# Patient Record
Sex: MALE | Race: ASIAN | NOT HISPANIC OR LATINO | Employment: PART TIME | URBAN - METROPOLITAN AREA
[De-identification: names, ages, dates, MRNs, and addresses within clinical notes are randomized per-mention and may not be internally consistent; named-entity substitution may affect disease eponyms.]

---

## 2023-12-21 ENCOUNTER — TRANSCRIBE ORDERS (OUTPATIENT)
Dept: PHYSICAL THERAPY | Facility: CLINIC | Age: 28
End: 2023-12-21
Payer: OTHER MISCELLANEOUS

## 2023-12-21 DIAGNOSIS — S46.911D STRAIN OF RIGHT SHOULDER, SUBSEQUENT ENCOUNTER: ICD-10-CM

## 2023-12-21 DIAGNOSIS — S46.912D STRAIN OF LEFT SHOULDER, SUBSEQUENT ENCOUNTER: Primary | ICD-10-CM

## 2023-12-28 ENCOUNTER — TREATMENT (OUTPATIENT)
Dept: PHYSICAL THERAPY | Facility: CLINIC | Age: 28
End: 2023-12-28
Payer: OTHER MISCELLANEOUS

## 2023-12-28 DIAGNOSIS — M25.611 DECREASED RANGE OF MOTION OF RIGHT SHOULDER: ICD-10-CM

## 2023-12-28 DIAGNOSIS — M25.511 ACUTE PAIN OF BOTH SHOULDERS: Primary | ICD-10-CM

## 2023-12-28 DIAGNOSIS — M25.512 ACUTE PAIN OF BOTH SHOULDERS: Primary | ICD-10-CM

## 2023-12-28 DIAGNOSIS — M25.612 DECREASED ROM OF LEFT SHOULDER: ICD-10-CM

## 2023-12-28 PROCEDURE — 97110 THERAPEUTIC EXERCISES: CPT | Performed by: PHYSICAL THERAPIST

## 2023-12-28 PROCEDURE — 97161 PT EVAL LOW COMPLEX 20 MIN: CPT | Performed by: PHYSICAL THERAPIST

## 2023-12-28 PROCEDURE — 97530 THERAPEUTIC ACTIVITIES: CPT | Performed by: PHYSICAL THERAPIST

## 2023-12-28 NOTE — PROGRESS NOTES
Physical Therapy Initial Evaluation and Plan of Care             [address] [facility] [address]    [place] [address]    Patient: Stephanie Trujillo   : 1995  Diagnosis/ICD-10 Code:  Acute pain of both shoulders [M25.511, M25.512]  Referring practitioner: Neto Patterson MD  Date of Initial Visit: 2023  Today's Date: 2023  Patient seen for 1 session         Visit Diagnoses:    ICD-10-CM ICD-9-CM   1. Acute pain of both shoulders  M25.511 719.41    M25.512    2. Decreased range of motion of right shoulder  M25.611 719.51   3. Decreased ROM of left shoulder  M25.612 719.51         Subjective Questionnaire: QuickDASH: 56.82% impairment      Subjective Evaluation    History of Present Illness  Date of onset: 2023  Mechanism of injury: Injured B shoulders in separate incidents at work. In 2023 developed L shoulder pn suddenly after donning his safety harness at work, which weighs approx 20 lbs. Was given sling, medication and company gave him 3 days off work. Pn has persisted and has been limited w/ use of his L arm at work. L shoulder pn localized to top of shoulder.     R shoulder injury occurred on 23 when pt fell from a 6 foot ladder and landed directly on his R shoulder w/ his arm against his side. Pn located on the superior/lateral aspect of the R shoulder and can radiate into the lateral upper arm. Will wake him if he lays on his R side.     Neither shoulder painful at rest, but will increase w/ movement of the arm away from the body, lifting heavy items close to him or any object w/ arms outstretched. (-) popping, clicking, N/T. Describes as sharp, needlike pn bilaterally. No significant loss of motion in L shoulder, but limited on R. Had (-) XR of R shoulder, MRI showing supraspinatus tendinosus. No imaging of L shoulder.    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 Involves standing on ladder/lift while performing  sustained overhead work. Lifts large, heavy rolls of plastic from ground to shoulder height repetitively.    F/u w/ ortho 24    Subjective comment: B shoulder pn  Patient Occupation: [employer] Quality of life: fair    Pain  Current pain ratin  At best pain ratin  At worst pain ratin    Hand dominance: right    Diagnostic Tests  Abnormal x-ray: R shoulder XR (-).  MRI studies: abnormal (mild chronic tendinosus of the supraspinatus)    Treatments  No previous or current treatments  Patient Goals  Patient goals for therapy: decreased pain, increased motion, increased strength, independence with ADLs/IADLs, return to sport/leisure activities and return to work             Treatment  See Exercise, Manual, and Modality Logs for complete treatment.     Access Code: [email or web address]  URL: [email or web address]  Date: 2023  Prepared by: Keiry Jacobson    Exercises  - Standing Shoulder and Trunk Flexion at Table  - 2-3 x daily - 5-10 reps  - Seated Shoulder External Rotation AAROM with Cane and Hand in Neutral  - 2-3 x daily - 5-10 reps  - Supine Shoulder Press AAROM in Abduction with Dowel  - 2-3 x daily - 15-20 reps  - Seated Scapular Retraction  - 2-3 x daily - 15-30 reps    Objective          Observations   Left Shoulder   Positive for deformity (questionable step off deformity L AC jt).     Right Shoulder  Negative for deformity.       Tenderness     Left Shoulder   Tenderness in the AC joint.     Right Shoulder  Tenderness in the biceps tendon (proximal), subacromial bursa and supraspinatus tendon.     Active Range of Motion   Left Shoulder   Flexion: 160 degrees with pain  Abduction: 165 degrees with pain  External rotation 0°: 60 degrees   External rotation BTH: T2   Internal rotation BTB: T8     Right Shoulder   Flexion: 140 degrees with pain  Abduction: 135 degrees with pain  External rotation 0°: 60 (pn top of shoulder) degrees with pain  External rotation BTH: C7   Internal rotation  BTB: L1 with pain    Passive Range of Motion   Left Shoulder   Flexion: 170 degrees   Abduction: 170 degrees   External rotation 0°: 80 degrees   External rotation 90°: 90 degrees     Right Shoulder   Flexion: 90 degrees   Abduction: 90 degrees   External rotation 0°: 60 degrees   External rotation 90°: 90 degrees     Additional Passive Range of Motion Details  Pn/guarding w/ attempted passive elevation R shoulder    Strength/Myotome Testing     Left Shoulder     Planes of Motion   Flexion: 5   Abduction: 4+   External rotation at 0°: 4+   Internal rotation at 0°: 5     Isolated Muscles   Biceps: 5   Triceps: 5     Right Shoulder     Planes of Motion   Flexion: 4-   Abduction: 3-   External rotation at 0°: 4   Internal rotation at 0°: 4     Isolated Muscles   Biceps: 5   Triceps: 5     Additional Strength Details  Pn w/ resisted IR/ER, elevation R shoulder  Pn w/ resisted ER L shoulder    Tests   Cervical     Left   Positive active compression (Chloride) (AC jt pn).     Right   Negative active compression (Chloride) and ULTT4.     Left Shoulder   Positive crossover, Hawkin's and Neer's.   Negative belly press, drop arm, external rotation lag sign, full can, internal rotation lag sign, painful arc, Alex's sign and Speed's.     Right Shoulder   Positive full can, Hawkin's and Neer's.   Negative belly press, drop arm, external rotation lag sign, internal rotation lag sign, painful arc, Alex's sign and Speed's.             Assessment & Plan       Assessment  Impairments: abnormal or restricted ROM, activity intolerance, impaired physical strength, lacks appropriate home exercise program and pain with function   Functional limitations: carrying objects, lifting, sleeping, pulling, pushing, uncomfortable because of pain, reaching behind back, reaching overhead and unable to perform repetitive tasks   Assessment details: Pt is a 28 YOM who presents to PT w/ complaint of acute onset B shoulder pn after separate work  injuries, the first involving the L shoulder and occurring in June/July 2023 after donning his safety harness at work, the second involving the R shoulder and occurring on 11/18/23 after falling from a ladder and landing directly on his R shoulder. Findings consistent w/ R RC strain, L AC joint sprain. Primary deficits include painful and limited shoulder mobility R>L, RC weakness R>L. Apparent step off deformity noted L AC jt where pt TTP. Also TTP R supraspinatus tendon, subacromial region. (-) drop arm, lag signs, labral tests. (+) impingement signs, pn w/ resisted elevation and rotation of the R shoulder; (+) crossover test, Manley w/ localized AC jt pn L shoulder. (-) mechanical symptoms, instability. Pn/deficits limit pt's tolerance to work/daily activities and disrupt sleep. Pt would benefit from skilled PT services to address deficits and allow return to full work and daily activities w/o pn or limitation.  Barriers to therapy: n/a  Prognosis: good    Goals  Plan Goals: STG 4wks  1) Pt to be compliant w/ initial HEP for ROM, strength, and symptom mgmt.  2) Pt to report pn w/ household activities to be no greater than 4/10.  3) Pt to improve B shoulder AROM to 160 deg elevation, 80 deg ER or better.  4) Pt to improve behind back IR ROM of the R shoulder to lower tspine or higher.  5) Pt to improve QD score to 40% impairment or better to reflect improved pn and function.    LTG 8wks  1) Pt to be independent w/ long term HEP and self mgmt.  2) Pt to tolerate 40 mins or more of work simulated activity w/ min to no pn or dysfunction.  3) Pt to improve shoulder strength to 4+/5 or better in all planes, bilaterally.  4) Pt to improve QD score to 20% impairment or better to reflect improved pn and function.       Plan  Therapy options: will be seen for skilled therapy services  Planned modality interventions: TENS, cryotherapy, thermotherapy (hydrocollator packs), ultrasound and dry needling  Planned therapy  interventions: flexibility, functional ROM exercises, home exercise program, joint mobilization, manual therapy, neuromuscular re-education, postural training, soft tissue mobilization, strengthening, stretching and therapeutic activities  Frequency: 2x week  Duration in weeks: 12  Treatment plan discussed with: patient  Plan details: PT POC to emphasize restoration of pn free shoulder mobility, scapular and shoulder strengthening, dynamic stabilization, and postural awareness utillizing TE/TA/NMR/MT, modalities as indicated for pn control        History # of Personal Factors and/or Comorbidities: LOW (0)  Examination of Body System(s): # of elements: LOW (1-2)  Clinical Presentation: EVOLVING  Clinical Decision Making: LOW       Timed:         Manual Therapy:    0     mins  01191;     Therapeutic Exercise:    15     mins  05246;     Neuromuscular Ericka:    0    mins  83314;    Therapeutic Activity:     10     mins  47961;     Gait Trainin     mins  54639;     Ultrasound:     0     mins  87741;    Ionto                               0    mins   67296  Self Care                       0     mins   13877  Canalith Repos    0     mins 64929      Un-Timed:  Electrical Stimulation:    0     mins  61443 ( );  Dry Needling     0     mins self-pay  Traction     0     mins 57430  Low Eval     25     Mins  90558  Mod Eval     0     Mins  75413  High Eval                       0     Mins  49403        Timed Treatment:   25   mins   Total Treatment:     50   mins          PT: Keiry Jacobson PT     [place] License: #[id number]    Electronically signed by Keiry Jacobson PT, 23, 1:03 PM EST    Certification Period: 2023 thru 3/26/2024  I certify that the therapy services are furnished while this patient is under my care.  The services outlined above are required by this patient, and will be reviewed every 90 days.         Physician Signature:__________________________________________________    PHYSICIAN:  Neto Patterson MD      DATE:     Please sign and return via fax to [phone]. Thank you, [facility].

## 2024-01-02 ENCOUNTER — TREATMENT (OUTPATIENT)
Dept: PHYSICAL THERAPY | Facility: CLINIC | Age: 29
End: 2024-01-02
Payer: OTHER MISCELLANEOUS

## 2024-01-02 DIAGNOSIS — M25.511 ACUTE PAIN OF BOTH SHOULDERS: Primary | ICD-10-CM

## 2024-01-02 DIAGNOSIS — M25.512 ACUTE PAIN OF BOTH SHOULDERS: Primary | ICD-10-CM

## 2024-01-02 DIAGNOSIS — M25.612 DECREASED ROM OF LEFT SHOULDER: ICD-10-CM

## 2024-01-02 DIAGNOSIS — M25.611 DECREASED RANGE OF MOTION OF RIGHT SHOULDER: ICD-10-CM

## 2024-01-02 PROCEDURE — 97112 NEUROMUSCULAR REEDUCATION: CPT | Performed by: PHYSICAL THERAPIST

## 2024-01-02 PROCEDURE — 97110 THERAPEUTIC EXERCISES: CPT | Performed by: PHYSICAL THERAPIST

## 2024-01-02 PROCEDURE — 97530 THERAPEUTIC ACTIVITIES: CPT | Performed by: PHYSICAL THERAPIST

## 2024-01-02 NOTE — PROGRESS NOTES
Physical Therapy Daily Treatment Note  [address] [facility]  [facility] [address]   [place] [address]      Patient: Stephanie Trujillo   : 1995  Referring practitioner: Neto Patterson MD  Date of Initial Visit: Type: THERAPY  Noted: 2023  Today's Date: 2024  Patient seen for 2 sessions       Visit Diagnoses:    ICD-10-CM ICD-9-CM   1. Acute pain of both shoulders  M25.511 719.41    M25.512    2. Decreased range of motion of right shoulder  M25.611 719.51   3. Decreased ROM of left shoulder  M25.612 719.51       Subjective   Stephanie Trujillo reports: Pn seems to be better. Not as painful to move his arms. Still painful to lay on R shoulder directly. Can sleep comfortably by changing position. No pn at rest. Went bowling over the holiday. Used his L hand primarily and didn't increase his pn. Attempted once with his R hand and didn't hurt, but didn't want to risk injuring further.    Pre tx pn score: 0  Post tx pn score: 0    Treatment  See Exercise, Manual, and Modality Logs for complete treatment.     Objective        Active Range of Motion   Left Shoulder   Flexion: 170 degrees without pn  Abduction: 165 degrees without pn  External rotation 0°: 60 degrees   External rotation BTH: T2   Internal rotation BTB: T8      Right Shoulder   Flexion: 140 degrees with pain  Abduction: 135 degrees with pain  External rotation 0°: 60 (pn top of shoulder) degrees with pain  External rotation BTH: T2  Internal rotation BTB: T9 with pain      Assessment & Plan       Assessment  Assessment details: Compliant w/ HEP and tolerating exercises well. Demo improved and less painful L shoulder mobility. R shoulder rotation ROM improved but elevation remains limited and painful. Continued w/ AAROM/stretching and scapular mobility exercises. Pt denied inc pn afterward. To add supine cane flxn and bent over row to HEP.    Plan  Plan details: Cont w/ scapular stabilization, shoulder ROM          Timed:         Manual Therapy:    0      mins  87465;     Therapeutic Exercise:    20     mins  15695;     Neuromuscular Ericka:    10    mins  87171;    Therapeutic Activity:     10     mins  25026;     Gait Trainin     mins  28241;     Ultrasound:     0     mins  49188;    Ionto                               0    mins   91590  Self Care                       0     mins   71937  Canalith Repos    0     mins 91949      Un-Timed:  Electrical Stimulation:    0     mins  37707 ( );  Dry Needling     0     mins self-pay  Traction     0     mins 77194      Timed Treatment:   40   mins   Total Treatment:     40   mins    Keiry Jacobson, PT  [place] License: #[id number]

## 2024-01-04 ENCOUNTER — TREATMENT (OUTPATIENT)
Dept: PHYSICAL THERAPY | Facility: CLINIC | Age: 29
End: 2024-01-04
Payer: OTHER MISCELLANEOUS

## 2024-01-04 DIAGNOSIS — M25.611 DECREASED RANGE OF MOTION OF RIGHT SHOULDER: ICD-10-CM

## 2024-01-04 DIAGNOSIS — M25.512 ACUTE PAIN OF BOTH SHOULDERS: Primary | ICD-10-CM

## 2024-01-04 DIAGNOSIS — M25.612 DECREASED ROM OF LEFT SHOULDER: ICD-10-CM

## 2024-01-04 DIAGNOSIS — M25.511 ACUTE PAIN OF BOTH SHOULDERS: Primary | ICD-10-CM

## 2024-01-04 PROCEDURE — 97110 THERAPEUTIC EXERCISES: CPT | Performed by: PHYSICAL THERAPIST

## 2024-01-04 PROCEDURE — 97140 MANUAL THERAPY 1/> REGIONS: CPT | Performed by: PHYSICAL THERAPIST

## 2024-01-04 PROCEDURE — 97112 NEUROMUSCULAR REEDUCATION: CPT | Performed by: PHYSICAL THERAPIST

## 2024-01-04 NOTE — PROGRESS NOTES
Physical Therapy Daily Treatment Note  [address]   [place] [address]      Patient: Stephanie Trujillo   : 1995  Referring practitioner: Neto Patterson MD  Date of Initial Visit: Type: THERAPY  Noted: 2023  Today's Date: 2024  Patient seen for 3 sessions       Visit Diagnoses:    ICD-10-CM ICD-9-CM   1. Acute pain of both shoulders  M25.511 719.41    M25.512    2. Decreased range of motion of right shoulder  M25.611 719.51   3. Decreased ROM of left shoulder  M25.612 719.51       Subjective   Stephanie Trujillo reports: Inc pn L shoulder after sawing material at work today. [place] ok after his last visit, no soreness. Till trying to avoid use of his R shoulder so R side not too painful at the moment. Following up w/ [facility] today.    Pre tx pn score: 3  Post tx pn score: 3    Treatment  See Exercise, Manual, and Modality Logs for complete treatment.     Objective         Assessment & Plan       Assessment  Assessment details: Inc L shoulder pn after work today. Regressed exercises slightly to avoid further exacerbation, focused on gentle AAROM and scap control activities. Incorporated joint mobilizations for pn modulation. Tight and slightly painful w/ posterior glide bilaterally. No inc in pn reported at end of visit.    Plan  Plan details: (+) post capsule stretch, may try TB rows          Timed:         Manual Therapy:    15     mins  19627;     Therapeutic Exercise:    25     mins  35071;     Neuromuscular Ericka:    8    mins  40183;    Therapeutic Activity:     0     mins  17341;     Gait Trainin     mins  64163;     Ultrasound:     0     mins  73657;    Ionto                               0    mins   99774  Self Care                       0     mins   67428  Canalith Repos    0     mins 26529      Un-Timed:  Electrical Stimulation:    0     mins  04847 (MC );  Dry Needling     0     mins self-pay  Traction     0     mins 21158      Timed Treatment:   48    mins   Total Treatment:     48   mins    Keiry Jacobson, PT  [place] License: #[id number]

## 2024-01-09 ENCOUNTER — TREATMENT (OUTPATIENT)
Dept: PHYSICAL THERAPY | Facility: CLINIC | Age: 29
End: 2024-01-09
Payer: OTHER MISCELLANEOUS

## 2024-01-09 DIAGNOSIS — M25.512 ACUTE PAIN OF BOTH SHOULDERS: Primary | ICD-10-CM

## 2024-01-09 DIAGNOSIS — M25.511 ACUTE PAIN OF BOTH SHOULDERS: Primary | ICD-10-CM

## 2024-01-09 DIAGNOSIS — M25.612 DECREASED ROM OF LEFT SHOULDER: ICD-10-CM

## 2024-01-09 DIAGNOSIS — M25.611 DECREASED RANGE OF MOTION OF RIGHT SHOULDER: ICD-10-CM

## 2024-01-09 NOTE — PROGRESS NOTES
Physical Therapy Daily Treatment Note  [address]   [place] [address]      Patient: Stephanie Trujillo   : 1995  Referring practitioner: Neto Patterson MD  Date of Initial Visit: Type: THERAPY  Noted: 2023  Today's Date: 2024  Patient seen for 4 sessions       Visit Diagnoses:    ICD-10-CM ICD-9-CM   1. Acute pain of both shoulders  M25.511 719.41    M25.512    2. Decreased range of motion of right shoulder  M25.611 719.51   3. Decreased ROM of left shoulder  M25.612 719.51       Subjective   Stephanie Trujillo reports: Exacerbated R shoulder pn at work today using a vacuum to clear water from the trunk of his vehicle. Reports inc pn lateral R shoulder, increases significantly w/ any movement of the R arm.  Saw Dr. Patterson after last visit, per pt report recommended he continue on current work restrictions. Will f/u again 24.    Pre tx pn score: 9-R shoulder  Post tx pn score: 9    Treatment  See Exercise, Manual, and Modality Logs for complete treatment.     Objective         Assessment & Plan       Assessment  Assessment details: R shoulder pn exacerbated after using vacuum at work earlier to today's appt. Attempted low level joint distraction, MT techniques to reduce pn and mm spasm but symptoms highly irritable and associated w/ strong muscle guarding. Pt reported inc pn w/ attempted scap mobility exercises. Did report slight relief w/ MHP and estim, but minimal and short lasting. Encouraged adherence to work restrictions, using ice and/or heat at home and on work breaks, considering use of sling temporarily to limit motion and reduce pn. Pt verbalized understanding.          Timed:         Manual Therapy:    12     mins  12321;     Therapeutic Exercise:    0     mins  30673;     Neuromuscular Ericka:    0    mins  79887;    Therapeutic Activity:     8     mins  94699;     Gait Trainin     mins  40030;     Ultrasound:     0     mins  10823;    Ionto                                0    mins   59528  Self Care                       0     mins   39208  Canalith Repos    0     mins 41065      Un-Timed:  Electrical Stimulation:    15     mins  40618 ( );  Dry Needling     0     mins self-pay  Traction     0     mins 24507      Timed Treatment:   35   mins   Total Treatment:     35   mins    Keiry Jacobson, PT  [place] License: #[id number]

## 2024-01-11 ENCOUNTER — TREATMENT (OUTPATIENT)
Dept: PHYSICAL THERAPY | Facility: CLINIC | Age: 29
End: 2024-01-11
Payer: OTHER MISCELLANEOUS

## 2024-01-11 DIAGNOSIS — M25.612 DECREASED ROM OF LEFT SHOULDER: ICD-10-CM

## 2024-01-11 DIAGNOSIS — M25.511 ACUTE PAIN OF BOTH SHOULDERS: Primary | ICD-10-CM

## 2024-01-11 DIAGNOSIS — M25.512 ACUTE PAIN OF BOTH SHOULDERS: Primary | ICD-10-CM

## 2024-01-11 DIAGNOSIS — M25.611 DECREASED RANGE OF MOTION OF RIGHT SHOULDER: ICD-10-CM

## 2024-01-11 PROCEDURE — 97110 THERAPEUTIC EXERCISES: CPT | Performed by: PHYSICAL THERAPIST

## 2024-01-11 PROCEDURE — 97140 MANUAL THERAPY 1/> REGIONS: CPT | Performed by: PHYSICAL THERAPIST

## 2024-01-11 PROCEDURE — 97112 NEUROMUSCULAR REEDUCATION: CPT | Performed by: PHYSICAL THERAPIST

## 2024-01-11 NOTE — PROGRESS NOTES
Physical Therapy Daily Treatment Note  [address] [facility] [address]   [place] [address]      Patient: Stephanie Trujillo   : 1995  Referring practitioner: Neto Patterson MD  Date of Initial Visit: Type: THERAPY  Noted: 2023  Today's Date: 2024  Patient seen for 5 sessions       Visit Diagnoses:    ICD-10-CM ICD-9-CM   1. Acute pain of both shoulders  M25.511 719.41    M25.512    2. Decreased range of motion of right shoulder  M25.611 719.51   3. Decreased ROM of left shoulder  M25.612 719.51       Subjective   Stephanie Trujillo reports: R shoulder pn much improved, seem to be better the morning after his last appt. Used ice at home and seemed to provide some relief. Worked today, didn't do anything too strenuous. No pn at time of appt.     Pre tx pn score: 0  Post tx pn score: 0    Treatment  See Exercise, Manual, and Modality Logs for complete treatment.     Access Code: [email or web address]  URL: [email or web address]  Date: 2024  Prepared by: Keiry Jacobson    Exercises  - Standing Shoulder and Trunk Flexion at Table  - 2 x daily - 5-10 reps  - Seated Shoulder External Rotation AAROM with Cane and Hand in Neutral  - 2 x daily - 5-10 reps  - Scapular Protraction at Wall  - 2 x daily - 15-30 reps  - Standing Shoulder Row with Anchored Resistance  - 2 x daily - 15-30 reps  - Shoulder extension with resistance - Neutral  - 2 x daily - 15-30 reps  - Supine Cross Body Shoulder Stretch  - 2 x daily - 3 reps - 30 sec hold    Objective          Tenderness     Additional Tenderness Details  TTP supraspinatus tendon bilaterally, L LHBT    Active Range of Motion   Left Shoulder   Flexion: 105 degrees   Abduction: 150 degrees   External rotation 0°: 70 degrees     Right Shoulder   Flexion: 105 degrees   Abduction: 150 degrees   External rotation 0°: 70 degrees   Internal rotation BTB: T9     Additional Active Range of Motion Details  AROM post tx improved to approx 135 flxn bilaterally-pn limited              Assessment & Plan       Assessment  Assessment details: Pn much improved compared to last visit. Demo much improved shoulder mobility. Remains limited w/ shoulder flxn bilaterally, reporting pn anterolateral shoulder/subacromial region at end range-improved 30 deg after MT techniques. Focused on facilitation of posterior scapular tilt, retraction to reduce impingement symptoms. Issued updated HEP and provided RTB for home use.    Plan  Plan details: Assess response to updated HEP, continued w/ scap control and strengthening as tolerated; scap PNF patterns, no money ER at wall, pec minor stretching          Timed:         Manual Therapy:    25     mins  06553;     Therapeutic Exercise:    15     mins  99795;     Neuromuscular Ericka:    10    mins  58031;    Therapeutic Activity:     0     mins  95421;     Gait Trainin     mins  73076;     Ultrasound:     0     mins  03207;    Ionto                               0    mins   32288  Self Care                       0     mins   61946  Canalith Repos    0     mins 48071      Un-Timed:  Electrical Stimulation:    0     mins  85229 ( );  Dry Needling     0     mins self-pay  Traction     0     mins 27541      Timed Treatment:   50   mins   Total Treatment:     50   mins    Keiry Jacobson, PT  [place] License: #[id number]

## 2024-01-16 ENCOUNTER — TREATMENT (OUTPATIENT)
Dept: PHYSICAL THERAPY | Facility: CLINIC | Age: 29
End: 2024-01-16
Payer: OTHER MISCELLANEOUS

## 2024-01-16 DIAGNOSIS — M25.611 DECREASED RANGE OF MOTION OF RIGHT SHOULDER: ICD-10-CM

## 2024-01-16 DIAGNOSIS — M25.612 DECREASED ROM OF LEFT SHOULDER: ICD-10-CM

## 2024-01-16 DIAGNOSIS — M25.512 ACUTE PAIN OF BOTH SHOULDERS: Primary | ICD-10-CM

## 2024-01-16 DIAGNOSIS — M25.511 ACUTE PAIN OF BOTH SHOULDERS: Primary | ICD-10-CM

## 2024-01-16 NOTE — PROGRESS NOTES
Physical Therapy Daily Treatment Note  [address] [facility]  [address]   [place] [address]      Patient: Stephanie Trujillo   : 1995  Referring practitioner: Neto Patterson MD  Date of Initial Visit: Type: THERAPY  Noted: 2023  Today's Date: 2024  Patient seen for 6 sessions       Visit Diagnoses:    ICD-10-CM ICD-9-CM   1. Acute pain of both shoulders  M25.511 719.41    M25.512    2. Decreased range of motion of right shoulder  M25.611 719.51   3. Decreased ROM of left shoulder  M25.612 719.51       Subjective   Stephanie Trujillo reports: No significant R shoulder pn since his last PT visit. L shoulder still painful intermittently-reports aching pn that can occur even at rest. General daily activities not as painful as before.     Pre tx pn score: 0  Post tx pn score: 0    Treatment  See Exercise, Manual, and Modality Logs for complete treatment.     Objective          Passive Range of Motion   Left Shoulder   Flexion: WFL  Abduction: WFL  External rotation 45°: WFL  External rotation 90°: WFL    Right Shoulder   Flexion: WFL  Abduction: WFL  External rotation 45°: WFL  External rotation 90°: WFL    Additional Passive Range of Motion Details  Mild end range pain posterolateral shoulder all planes            Assessment & Plan       Assessment  Assessment details: Demo functional shoulder abduction, ER mobility but remains limited w/ active forward elevation bilaterally 2/2 pn. PROM intact. Incorporated [place] AAROM w/ cane, tolerated well on R side, painful initially on L. However able to elevate fully and w/ min pn w/ manual scapular assist for posterior tilt and upward rotation. Proceeded w/ active scap control and strengthening exercises. By end of visit demo approx 150 deg or more of active elevation. No pn reported at end of visit.    Plan  Plan details: Cont w/ scapular strengthening/stabilization          Timed:         Manual Therapy:    15     mins  97300;     Therapeutic Exercise:    20      mins  43975;     Neuromuscular Ericka:    10    mins  90981;    Therapeutic Activity:     8     mins  65734;     Gait Trainin     mins  10853;     Ultrasound:     0     mins  81372;    Ionto                               0    mins   27882  Self Care                       0     mins   67111  Canalith Repos    0     mins 93449      Un-Timed:  Electrical Stimulation:    0     mins  35947 ( );  Dry Needling     0     mins self-pay  Traction     0     mins 71059      Timed Treatment:   53   mins   Total Treatment:     53   mins    Keiry Jacobson, PT  [place] License: #[id number]

## 2024-01-18 ENCOUNTER — TREATMENT (OUTPATIENT)
Dept: PHYSICAL THERAPY | Facility: CLINIC | Age: 29
End: 2024-01-18
Payer: OTHER MISCELLANEOUS

## 2024-01-18 DIAGNOSIS — M25.611 DECREASED RANGE OF MOTION OF RIGHT SHOULDER: ICD-10-CM

## 2024-01-18 DIAGNOSIS — M25.512 ACUTE PAIN OF BOTH SHOULDERS: Primary | ICD-10-CM

## 2024-01-18 DIAGNOSIS — M25.511 ACUTE PAIN OF BOTH SHOULDERS: Primary | ICD-10-CM

## 2024-01-18 DIAGNOSIS — M25.612 DECREASED ROM OF LEFT SHOULDER: ICD-10-CM

## 2024-01-18 NOTE — PROGRESS NOTES
Physical Therapy Daily Treatment Note  [address] [facility]  [facility] [address]   [place] [address]      Patient: Stephanie Trujillo   : 1995  Referring practitioner: Neto Patterson MD  Date of Initial Visit: Type: THERAPY  Noted: 2023  Today's Date: 2024  Patient seen for 7 sessions       Visit Diagnoses:    ICD-10-CM ICD-9-CM   1. Acute pain of both shoulders  M25.511 719.41    M25.512    2. Decreased range of motion of right shoulder  M25.611 719.51   3. Decreased ROM of left shoulder  M25.612 719.51       Subjective   Stephanie Trujillo reports: No pn in R or L shoulder over the last couple of days. Still adhering to work restrictions. Daily activities are pain free except in OH reaching at end range.     Pre tx pn score: 0  Post tx pn score: 0    Treatment  See Exercise, Manual, and Modality Logs for complete treatment.     Objective     L Shoulder:147 deg flex  R Shoulder:155 deg flex      Assessment & Plan       Assessment  Assessment details: Continues to show improvement with scapular control and shoulder mobility with little to no pain. Demo improved shoulder flexion with slight pain at end range, primarily in the L. Progressed to diagonal crossbody scapular retraction to facilitate improved scapular control/mechanics, with mild muscle fatigue and mild pn at end range on the left. Symptom irritability has decreased with exercise and daily activity.     Plan  Plan details: Continue with scapular strengthening/stabilizations. Progress into overhead activity as tolerated.          Timed:         Manual Therapy:    0     mins  21135;     Therapeutic Exercise:    20     mins  06572;     Neuromuscular Ericka:    12    mins  93039;    Therapeutic Activity:     8     mins  15700;     Gait Trainin     mins  20954;     Ultrasound:     0     mins  81831;    Ionto                               0    mins   55132  Self Care                       0     mins   93200  Canalith Repos    0     mins  17396      Un-Timed:  Electrical Stimulation:    0     mins  52074 ( );  Dry Needling     0     mins self-pay  Traction     0     mins 25857      Timed Treatment:   40   mins   Total Treatment:     40   mins    Latisha Parks Physical Therapist Student completed treatment under my direct supervision.     01/18/2024      Keiry Jacobson, PT  [place] License: #[id number]

## 2024-01-25 ENCOUNTER — TREATMENT (OUTPATIENT)
Dept: PHYSICAL THERAPY | Facility: CLINIC | Age: 29
End: 2024-01-25
Payer: OTHER MISCELLANEOUS

## 2024-01-25 DIAGNOSIS — M25.511 ACUTE PAIN OF BOTH SHOULDERS: Primary | ICD-10-CM

## 2024-01-25 DIAGNOSIS — M25.612 DECREASED ROM OF LEFT SHOULDER: ICD-10-CM

## 2024-01-25 DIAGNOSIS — M25.611 DECREASED RANGE OF MOTION OF RIGHT SHOULDER: ICD-10-CM

## 2024-01-25 DIAGNOSIS — M25.512 ACUTE PAIN OF BOTH SHOULDERS: Primary | ICD-10-CM

## 2024-01-25 NOTE — PROGRESS NOTES
Physical Therapy Daily Treatment Note  [address]   [place] [address]      Patient: Stephanie Trujillo   : 1995  Referring practitioner: Neto Patterson MD  Date of Initial Visit: Type: THERAPY  Noted: 2023  Today's Date: 2024  Patient seen for 8 sessions       Visit Diagnoses:    ICD-10-CM ICD-9-CM   1. Acute pain of both shoulders  M25.511 719.41    M25.512    2. Decreased range of motion of right shoulder  M25.611 719.51   3. Decreased ROM of left shoulder  M25.612 719.51       Subjective   Stephanie Trujillo reports: it is going okay. Been very busy in work and outside of work. Expresses increase in stress with finances and family situation. Unable to keep up with HEP, but nothing new with shoulder pn. Reports experiencing very mild/minor pn.    Pre tx pn score: 1  Post tx pn score: 1    Treatment  See Exercise, Manual, and Modality Logs for complete treatment.     Objective     Bilateral shoulder AROM approx 160-170 deg      Assessment & Plan       Assessment  Assessment details: Pt reports having minimal pn, demonstrates restored shoulder mobility in all planes with little to no discomfort. Able to progress strengthening with little complaint. Pt expresses increased stress in personal life, provided optional mental health resources. Updated HEP and reviewed with pt.     Plan  Plan details: Continue with shoulder strengthening/stabilization. Progressing to overhead activities to simulate work duties.           Timed:         Manual Therapy:    0     mins  81828;     Therapeutic Exercise:    23     mins  97660;     Neuromuscular Ericka:    15    mins  00422;    Therapeutic Activity:     8     mins  95796;     Gait Trainin     mins  14621;     Ultrasound:     0     mins  62078;    Ionto                               0    mins   21828  Self Care                       0     mins   10131  Canalith Repos    0     mins 76281      Un-Timed:  Electrical Stimulation:    0      mins  53077 ( );  Dry Needling     0     mins self-pay  Traction     0     mins 34844      Timed Treatment:   46   mins   Total Treatment:     46   mins    Latisha Parks Physical Therapist Student completed treatment under my direct supervision.     01/25/2024      Keiry Jacobson, PT  [place] License: #[id number]

## 2024-01-30 ENCOUNTER — TREATMENT (OUTPATIENT)
Dept: PHYSICAL THERAPY | Facility: CLINIC | Age: 29
End: 2024-01-30
Payer: OTHER MISCELLANEOUS

## 2024-01-30 DIAGNOSIS — M25.611 DECREASED RANGE OF MOTION OF RIGHT SHOULDER: ICD-10-CM

## 2024-01-30 DIAGNOSIS — M25.511 ACUTE PAIN OF BOTH SHOULDERS: Primary | ICD-10-CM

## 2024-01-30 DIAGNOSIS — M25.512 ACUTE PAIN OF BOTH SHOULDERS: Primary | ICD-10-CM

## 2024-01-30 DIAGNOSIS — M25.612 DECREASED ROM OF LEFT SHOULDER: ICD-10-CM

## 2024-01-30 NOTE — PROGRESS NOTES
Physical Therapy Reassessment  [address] [facility] [address]    [place] [address]  Patient: Stephanie Trujillo   : 1995  Diagnosis/ICD-10 Code:  Acute pain of both shoulders [M25.511, M25.512]  Referring practitioner: Neto Patterson MD  Date of Initial Visit: 2024  Today's Date: 2024  Patient seen for 9 sessions         Visit Diagnoses:    ICD-10-CM ICD-9-CM   1. Acute pain of both shoulders  M25.511 719.41    M25.512    2. Decreased range of motion of right shoulder  M25.611 719.51   3. Decreased ROM of left shoulder  M25.612 719.51       Subjective Questionnaire: QuickDASH: 27.27% impairment  Clinical Progress: improved  Home Program Compliance: Yes  Treatment has included: therapeutic exercise, neuromuscular re-education, manual therapy, therapeutic activity, electrical stimulation, ultrasound, moist heat, and cryotherapy      Subjective   Stephanie Trujillo reports: no new complaints. Feels no pn today. MRI for L shoulder scheduled for Friday. Continues to have trouble with sustained overhead, throwing motion and scrubbing walls at work.    Pre tx pn score: 0  Post tx pn score: 1    Treatment  See Exercise, Manual, and Modality Logs for complete treatment.     Objective          Observations   Left Shoulder   Negative for deformity.     Right Shoulder  Negative for deformity.       Tenderness     Left Shoulder   Tenderness in the biceps tendon (proximal). No tenderness in the AC joint.     Right Shoulder  Tenderness in the biceps tendon (proximal). No tenderness in the subacromial bursa and supraspinatus tendon.     Active Range of Motion   Left Shoulder   Flexion: WFL  Abduction: Left shoulder active abduction: pn at end range post HH. WFL and with pain  External rotation 0°: Left shoulder active external rotation at 0 degrees: pn at end range post HH. WFL  External rotation BTH: T3   Internal rotation BTB: T5     Right Shoulder   Abduction: Right shoulder active abduction: pn at end  range post HH. WFL and with pain  External rotation 0°: Right shoulder active external rotation at 0 degrees: pn at end range post HH. WFL and with pain  External rotation BTH: T3   Internal rotation BTB: T10 with pain    Passive Range of Motion   Left Shoulder   Flexion: WFL  External rotation 0°: with pain  External rotation 90°: with pain    Right Shoulder   Flexion: WFL  Abduction: WFL  External rotation 0°: WFL and with pain  External rotation 90°: WFL and with pain    Strength/Myotome Testing     Left Shoulder     Planes of Motion   Flexion: 5   Abduction: 4+   External rotation at 0°: 4+   Internal rotation at 0°: 5     Isolated Muscles   Biceps: 5   Triceps: 5     Right Shoulder     Planes of Motion   Flexion: 5   Abduction: 4+   External rotation at 0°: 4+   Internal rotation at 0°: 5     Isolated Muscles   Biceps: 5   Triceps: 5             Assessment & Plan       Assessment  Assessment details: Reassessment performed. Pt reports no pn in most daily activities, continues to c/o of pn/discomfort with OH, follow through of throwing motion, and repetitive activity at shoulder height (scrubbing walls, etc.). Pt demos restored mobility in all planes, in exception to limitations with L ER.  Demos improvements in shoulder strength to 4+/5 or better grossly. TTP to LHB tendon B/L. Continues to demo mild cuff weakness, leading to ongoing difficulty with OH and more strenuous activity. Pt progressing well toward PT goals. Pt would benefit from continued therapy services to continue building strength in overhead activities to meet work demands. Introduced OH strengthening/stabilization exercises and tolerated well. Reviewed HEP/POC.     Goals  Plan Goals: Goals  Plan Goals: STG 4wks  1) Pt to be compliant w/ initial HEP for ROM, strength, and symptom mgmt. -MET  2) Pt to report pn w/ household activities to be no greater than 4/10. -MET  3) Pt to improve B shoulder AROM to 160 deg elevation, 80 deg ER or better.  -PARTIALLY MET  4) Pt to improve behind back IR ROM of the R shoulder to lower tspine or higher. -MET  5) Pt to improve QD score to 40% impairment or better to reflect improved pn and function. -MET    LTG 8wks  1) Pt to be independent w/ long term HEP and self mgmt.-PROGRESSING  2) Pt to tolerate 40 mins or more of work simulated activity w/ min to no pn or dysfunction. -PROGRESSING  3) Pt to improve shoulder strength to 4+/5 or better in all planes, bilaterally. -MET  4) Pt to improve QD score to 20% impairment or better to reflect improved pn and function. -PROGRESSING    Plan  Plan details: Continue with functional strength and endurance, continue to build on OH activities.         Progress toward previous goals: Partially Met        Timed:         Manual Therapy:    0     mins  66902;     Therapeutic Exercise:    15     mins  04664;     Neuromuscular Ericka:    0    mins  61481;    Therapeutic Activity:     40     mins  84164;     Gait Trainin     mins  70116;     Ultrasound:     0     mins  77909;    Ionto                               0    mins   20735  Self Care                       0     mins   92744  Canalith Repos    0     mins 40259      Un-Timed:  Electrical Stimulation:    0     mins  13210 ( );  Dry Needling     0     mins self-pay  Traction     0     mins 04660  Re-Eval                           0    mins  74660      Timed Treatment:   55   mins   Total Treatment:     55   mins      Latisha Parks Physical Therapist Student completed treatment under my direct supervision.     2024      PT: Keiry Jacobson, PT     [place] License: #[id number]    Electronically signed by Latisha Parks, PT Student, 24, 2:27 PM EST

## 2024-02-01 ENCOUNTER — TREATMENT (OUTPATIENT)
Dept: PHYSICAL THERAPY | Facility: CLINIC | Age: 29
End: 2024-02-01
Payer: OTHER MISCELLANEOUS

## 2024-02-01 DIAGNOSIS — M25.512 ACUTE PAIN OF BOTH SHOULDERS: Primary | ICD-10-CM

## 2024-02-01 DIAGNOSIS — M25.611 DECREASED RANGE OF MOTION OF RIGHT SHOULDER: ICD-10-CM

## 2024-02-01 DIAGNOSIS — M25.612 DECREASED ROM OF LEFT SHOULDER: ICD-10-CM

## 2024-02-01 DIAGNOSIS — M25.511 ACUTE PAIN OF BOTH SHOULDERS: Primary | ICD-10-CM

## 2024-02-01 PROCEDURE — 97530 THERAPEUTIC ACTIVITIES: CPT | Performed by: PHYSICAL THERAPIST

## 2024-02-01 PROCEDURE — 97110 THERAPEUTIC EXERCISES: CPT | Performed by: PHYSICAL THERAPIST

## 2024-02-01 NOTE — PROGRESS NOTES
Physical Therapy Daily Treatment Note                  [address] [facility] [address]  [place] [address]      Patient: Stephanie Trujillo   : 1995  Diagnosis/ICD-10 Code:  Acute pain of both shoulders [M25.511, M25.512]  Referring practitioner: Neto Patterson MD  Date of Initial Visit: Type: THERAPY  Noted: 2023  Today's Date: 2024  Patient seen for 10 sessions             Subjective   Stephanie Trujillo reports: no pn today. Exercises at home are going well, just gets tired.    Objective   See Exercise, Manual, and Modality Logs for complete treatment.       Assessment & Plan       Assessment  Assessment details: Continued with progressions of scapular/shoulder strengthening and stabilization exercises. Consistently shows no pn with no exercise. Fatigues quickly during outstretched and overhead activities. Will continue with updated HEP.       Progress per Plan of Care and Progress strengthening /stabilization /functional activity           Timed:  Manual Therapy:    0     mins  90656;  Therapeutic Exercise:    24     mins  53699;     Neuromuscular Ericka:    0    mins  24028;    Therapeutic Activity:     8     mins  30207;     Gait Trainin     mins  07314;     Ultrasound:     0     mins  55772;    Electrical Stimulation:    0     mins  73283;  Iontophoresis     0     mins  89996    Untimed:  Electrical Stimulation:    0     mins  27805 ( );  Mechanical Traction:    0     mins  55583;   Fluidotherapy     0     mins  40898    Timed Treatment:   32   mins   Total Treatment:     32   mins        Keiry Bourne PTA  Physical Therapist Assistant

## 2024-02-06 ENCOUNTER — TREATMENT (OUTPATIENT)
Dept: PHYSICAL THERAPY | Facility: CLINIC | Age: 29
End: 2024-02-06
Payer: OTHER MISCELLANEOUS

## 2024-02-06 DIAGNOSIS — M25.611 DECREASED RANGE OF MOTION OF RIGHT SHOULDER: ICD-10-CM

## 2024-02-06 DIAGNOSIS — M25.511 ACUTE PAIN OF BOTH SHOULDERS: Primary | ICD-10-CM

## 2024-02-06 DIAGNOSIS — M25.512 ACUTE PAIN OF BOTH SHOULDERS: Primary | ICD-10-CM

## 2024-02-06 DIAGNOSIS — M25.612 DECREASED ROM OF LEFT SHOULDER: ICD-10-CM

## 2024-02-06 PROCEDURE — 97112 NEUROMUSCULAR REEDUCATION: CPT | Performed by: PHYSICAL THERAPIST

## 2024-02-06 PROCEDURE — 97110 THERAPEUTIC EXERCISES: CPT | Performed by: PHYSICAL THERAPIST

## 2024-02-06 PROCEDURE — 97530 THERAPEUTIC ACTIVITIES: CPT | Performed by: PHYSICAL THERAPIST

## 2024-02-06 NOTE — PROGRESS NOTES
Physical Therapy Daily Treatment Note  [address]   [place] [address]      Patient: Stephanie Trujillo   : 1995  Referring practitioner: Neto Patterson MD  Date of Initial Visit: Type: THERAPY  Noted: 2023  Today's Date: 2024  Patient seen for 11 sessions       Visit Diagnoses:    ICD-10-CM ICD-9-CM   1. Acute pain of both shoulders  M25.511 719.41    M25.512    2. Decreased range of motion of right shoulder  M25.611 719.51   3. Decreased ROM of left shoulder  M25.612 719.51       Subjective   Stephanie Trujillo reports: Had MRI last Friday, does not know results yet. Waiting for appt w/ Ortho to discuss. Feeling good. No pn at rest or w/ routine daily activities. Can don/doff jacket now w/o pn. Was able to do some light overhead activity at work today, tightening bolts overhead, no pn.     Pre tx pn score: 0  Post tx pn score: 0    Treatment  See Exercise, Manual, and Modality Logs for complete treatment.     Objective         Assessment & Plan       Assessment  Assessment details: Pt virtually asymptomatic w/ ADLs and modified work activities, including some short duration overhead tasks. Awaiting MRI results and Ortho f/u. Continued w/ progression of functional strengthening exercises. Able to perform resisted overhead motions w/o onset of shoulder pn. Pt has one approved visit remaining. Could benefit from a few additional visits to build tolerance to sustained overhead tasks required at work. Issued updated HEP today.    Plan  Plan details: Cont w/ functional strengthening, work simulated activity          Timed:         Manual Therapy:    0     mins  78952;     Therapeutic Exercise:    24     mins  42636;     Neuromuscular Ericka:    8    mins  68190;    Therapeutic Activity:     10     mins  48712;     Gait Trainin     mins  30347;     Ultrasound:     0     mins  20906;    Ionto                               0    mins   48846  Self Care                       0      mins   11615  Canalith Repos    0     mins 89812      Un-Timed:  Electrical Stimulation:    0     mins  26958 ( );  Dry Needling     0     mins self-pay  Traction     0     mins 34952      Timed Treatment:   42   mins   Total Treatment:     42   mins    Keiry Jacobson, PT  [place] License: #[id number]

## 2024-02-07 DIAGNOSIS — M25.512 LEFT SHOULDER PAIN, UNSPECIFIED CHRONICITY: Primary | ICD-10-CM

## 2024-02-08 ENCOUNTER — OFFICE VISIT (OUTPATIENT)
Age: 29
End: 2024-02-08
Payer: OTHER MISCELLANEOUS

## 2024-02-08 ENCOUNTER — TREATMENT (OUTPATIENT)
Dept: PHYSICAL THERAPY | Facility: CLINIC | Age: 29
End: 2024-02-08
Payer: OTHER MISCELLANEOUS

## 2024-02-08 VITALS
DIASTOLIC BLOOD PRESSURE: 78 MMHG | SYSTOLIC BLOOD PRESSURE: 118 MMHG | WEIGHT: 162.6 LBS | BODY MASS INDEX: 26.13 KG/M2 | HEIGHT: 66 IN

## 2024-02-08 DIAGNOSIS — M75.82 ROTATOR CUFF TENDINITIS, LEFT: ICD-10-CM

## 2024-02-08 DIAGNOSIS — M75.81 RIGHT ROTATOR CUFF TENDINITIS: Primary | ICD-10-CM

## 2024-02-08 DIAGNOSIS — M25.612 DECREASED ROM OF LEFT SHOULDER: ICD-10-CM

## 2024-02-08 DIAGNOSIS — M25.511 ACUTE PAIN OF BOTH SHOULDERS: Primary | ICD-10-CM
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
## 2024-02-08 DIAGNOSIS — M25.512 ACUTE PAIN OF BOTH SHOULDERS: Primary | ICD-10-CM

## 2024-02-08 DIAGNOSIS — M25.611 DECREASED RANGE OF MOTION OF RIGHT SHOULDER: ICD-10-CM

## 2024-02-08 NOTE — PROGRESS NOTES
[facility] Visit     Office Visit       Date: 02/08/2024   Patient Name: Stephanie Trujillo  MRN: [id number]  YOB: 1995    Referring Physician: Neto Patterson MD     Chief Complaint:   Chief Complaint   Patient presents with    Left Shoulder - Pain    Right Shoulder - Pain       History of Present Illness:   Stephanie Trujillo is a 28 y.o. male who presents with new problem of: bilateral shoulder pain.  Onset: mechanical fall. The issue has been ongoing for 12 week(s). Pain is a 1/10 on the pain scale. Pain is described as aching. Associated symptoms include pain. The pain is worse with working; resting improve the pain. Previous treatments have included: NSAIDS, physical therapy, and oral steroids.    Subjective   Review of Systems: Review of Systems   Constitutional:  Negative for chills, fever, unexpected weight gain and unexpected weight loss.   HENT:  Negative for congestion, postnasal drip and rhinorrhea.    Eyes:  Negative for blurred vision.   Respiratory:  Negative for shortness of breath.    Cardiovascular:  Negative for leg swelling.   Gastrointestinal:  Negative for abdominal pain, nausea and vomiting.   Genitourinary:  Negative for difficulty urinating.   Musculoskeletal:  Positive for arthralgias. Negative for gait problem, joint swelling and myalgias.   Skin:  Negative for skin lesions and wound.   Neurological:  Negative for dizziness, weakness, light-headedness and numbness.   Hematological:  Does not bruise/bleed easily.   Psychiatric/Behavioral:  Negative for depressed mood.         I have reviewed the following portions of the patient's history:History of Present Illness and review of systems.    Past Medical History:   Past Medical History:   Diagnosis Date    Acid reflux     Headache        Past Surgical History: History reviewed. No pertinent surgical history.    Family History: History reviewed. No pertinent  "family history.    Social History:   Social History     Socioeconomic History    Marital status: Single   Tobacco Use    Smoking status: Former     Types: Cigarettes     Quit date: 2024     Years since quittin.1    Smokeless tobacco: Never   Vaping Use    Vaping Use: Every day    Substances: Flavoring   Substance and Sexual Activity    Alcohol use: Never    Drug use: Not Currently    Sexual activity: Defer       Medications: No current outpatient medications on file.    Allergies: No Known Allergies    I reviewed the patient's chief complaint, history of present illness, review of systems, past medical history, surgical history, family history, social history, medications and allergy list.     Objective    Vital Signs:   Vitals:    24 1304   BP: 118/78   Weight: 73.8 kg (162 lb 9.6 oz)   Height: 167.6 cm (66\")     Body mass index is 26.24 kg/m².   BMI is >= 25 and <30. (Overweight) The following options were offered after discussion;: exercise counseling/recommendations and nutrition counseling/recommendations      Patient reports that he is a former smoker. He quit smoking in .  He has not resumed smoking since that time.  This behavior was applauded and she was encouraged to continue in smoking cessation.  We will continue to monitor at subsequent visits.    Ortho Exam:  Constitutional: General Appearance: healthy-appearing, NAD, and normal body habitus.   Psychiatric: Orientation: oriented to time, place, and person. Mood and Affect: normal mood and affect and active and alert.   C-Spine/Neck: Active Range of Motion: no crepitus or pain elicited on motion and flexion normal, extension normal, rotation normal, and lateral flexion normal. Passive Range of Motion: flexion normal, extension normal, rotation normal, and lateral flexion normal.   Skin: Right Upper Extremity: normal. Left Upper Extremity: normal.   right shoulder exam:   Normal shoulder contour without evidence of swelling or " ecchymosis. Negative erythema.   Active range of motion is 0° to 160° abduction, 0° to 160° forward elevation, 80° of external rotation, and internal rotation to the back.   Passive range of motion is 0° to 160° abduction, 0° to 160° forward elevation, 80° of external rotation, and internal rotation again to the back.   Motor strength against resisted abduction, forward elevation, external and internal rotation is 5/5.   No Tenderness to palpation at the bicipital groove.   Negative tenderness to posterior palpation.   Positive tenderness to lateral palpation.   Positive tenderness to palpation at the a.c. joint. + pain with crossbody adduction   Positive Ahumada sign.   Negative [place] Ari's test.   negative speeds test.   Negative Yergason's test.   Negative liftoff test.   No anterior or posterior shoulder instability is present.   Negative shoulder apprehension.   full elbow range of motion.   Full sensation to all digits.    Results Review:   Imaging Results (Last 24 Hours)       ** No results found for the last 24 hours. **        I personally reviewed and interpreted radiographs of the right shoulder from 11/21/2023.  No acute fracture dislocation soft tissue swelling, or degenerative change noted.            Procedures    Assessment / Plan    Assessment/Plan:   Diagnoses and all orders for this visit:    1. Right rotator cuff tendinitis (Primary)    2. Rotator cuff tendinitis, left    3. Fall from ladder, initial encounter    Worker's Compensation case.  Injury occurred back in November 2023.  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 Radiographs taken immediately after did not show acute osseus abnormality. He has been on diclofenac, medrol dose pack, and flexeril to control his pain. He has been on light duty at work. He has also been in PT. MRI of the right shoulder showed chronic tendinosis of the supraspinatus tendon.  MRI left shoulder showed mild tendinosis in the distal supraspinatus tendon.   Symptoms have improved.  He still has occasional pain and has not been doing lifting since he has been on light duty.  However, he feels ready return to work.  On exam today, he has full range of motion and strength.  This only elicits mild pain.  I believe that he is ready to return to work as without restriction.  I will qualify the his endurance will be less has not been using the shoulder as much.  He should be allowed to take breaks up to including light duty work for limited time if/when his pain returns.  Continue with diclofenac as needed for pain control.  Restrictions were given today note today.  Plan to see him back in 6 weeks to monitor his response to return to work and decide on additional management.    Previous imaging studies reviewed: 11/21/2023 - radiographs of the right shoulder. 12/13/2023- MRI Right shoulder. 2/2/2024 - MRI left shoulder.    Follow Up:   No follow-ups on file.      Alex David MD  [facility] [place] [facility]

## 2024-02-08 NOTE — PROGRESS NOTES
Physical Therapy Daily Treatment Note  [address]   [place] [address]      Patient: Stephanie Trujillo   : 1995  Referring practitioner: Neto Patterson MD  Date of Initial Visit: No linked episodes  Today's Date: 2024  Patient seen for Visit count could not be calculated. Make sure you are using a visit which is associated with an episode. sessions       Visit Diagnoses:  No diagnosis found.    Subjective   Stephanie Trujillo reports: ***    Pre tx pn score: {CKA numbers 0-10:54843}  Post tx pn score: {CKA numbers 0-10:54203}    Treatment  See Exercise, Manual, and Modality Logs for complete treatment.     Objective         Assessment/Plan      Timed:         Manual Therapy:    ***     mins  45105;     Therapeutic Exercise:    ***     mins  25346;     Neuromuscular Ericka:    ***    mins  90777;    Therapeutic Activity:     ***     mins  99074;     Gait Training:      ***     mins  57772;     Ultrasound:     ***     mins  02989;    Ionto                               ***    mins   75836  Self Care                       ***     mins   53213  Canalith Repos    ***     mins 28872      Un-Timed:  Electrical Stimulation:    ***     mins  75742 ( );  Dry Needling     ***     mins self-pay  Traction     ***     mins 84755      Timed Treatment:   ***   mins   Total Treatment:     ***   mins    Keiry Jacobson, PT  [place] License: #[id number]

## 2024-02-08 NOTE — PROGRESS NOTES
Discharge Summary  [address] [facility] [address]    [place] [address]      Patient: Stephanie Trujillo   : 1995  Diagnosis/ICD-10 Code:  Acute pain of both shoulders [M25.511, M25.512]  Referring practitioner: Neto Patterson MD  Date of Initial Visit: Type: THERAPY  Noted: 2023  Today's Date: 2024  Patient seen for 12 sessions      Subjective:   Subjective Questionnaire: QuickDASH: 6.82% impairment  Clinical Progress: improved  Home Program Compliance: Yes  Treatment has included: therapeutic exercise, neuromuscular re-education, manual therapy, therapeutic activity, electrical stimulation, ultrasound, moist heat, and cryotherapy    Subjective    Stephanie Trujillo reports: f/u with ortho today. Has officially been released from work restrictions. No complaint of pain or limitation with daily/light work activities. No issues with updated HEP.    Pre tx pn score: 0  Post tx pn score: 0      Treatment  See Exercise, Manual, and Modality Logs for complete treatment.        Objective     Left Shoulder   Flexion: WFL  External rotation 0°: WFL  External rotation 90°: WFL mild pn    Right Shoulder   Flexion: WFL  Abduction: WFL  External rotation 0°: WFL   External rotation 90°: WFL mild pn    Strength/Myotome Testing     Left Shoulder     Planes of Motion   Flexion: 5   Abduction: 5   External rotation at 0°: 5  Internal rotation at 0°: 5       Right Shoulder     Planes of Motion   Flexion: 5   Abduction: 5   External rotation at 0°: 5   Internal rotation at 0°: 5         Assessment & Plan       Assessment  Assessment details: Pt has been released to RTW without restrictions. Demonstrates functional B shoulder mobility in all planes with min to no pain, 5/5 shoulder strength. No reported pn w/ MMT. Able to complete work simulated activities including repetitive and resisted overhead tasks with min to no pain. Has met all PT goals, except shoulder ER ROM, which has improved and is  functional for pt's needs. Reviewed final HEP. Educated on continued strengthening and stabilization exercise to ensure no issues with full work duties. Pt appropriate to discharge to independent management.     Goals  Plan Goals: Plan Goals: STG 4wks  1) Pt to be compliant w/ initial HEP for ROM, strength, and symptom mgmt. -MET  2) Pt to report pn w/ household activities to be no greater than 4/10. -MET  3) Pt to improve B shoulder AROM to 160 deg elevation, 80 deg ER or better. -NOT MET, IMPROVED/FUNCTIONAL  4) Pt to improve behind back IR ROM of the R shoulder to lower tspine or higher. -MET  5) Pt to improve QD score to 40% impairment or better to reflect improved pn and function. -MET    LTG 8wks  1) Pt to be independent w/ long term HEP and self mgmt.-MET  2) Pt to tolerate 40 mins or more of work simulated activity w/ min to no pn or dysfunction. -MET  3) Pt to improve shoulder strength to 4+/5 or better in all planes, bilaterally. -MET  4) Pt to improve QD score to 20% impairment or better to reflect improved pn and function. -MET    Plan  Plan details: D/C            PT Signature: Keiry Jacobson, PT        Timed:  Manual Therapy:    0     mins  08490;  Therapeutic Exercise:    15     mins  55615;     Neuromuscular Ericka:    0    mins  40127;    Therapeutic Activity:     23     mins  23103;     Gait Trainin     mins  73585;     Ultrasound:     0     mins  66839;    Electrical Stimulation:    0     mins  66523 ( );    Untimed:  Electrical Stimulation:    0     mins  78311 ( );  Mechanical Traction:    0     mins  48608;     Timed Treatment:   38   mins   Total Treatment:     38   mins        Latisha Parks Physical Therapist Student completed treatment under my direct supervision.     2024    Keiry Jacobson PT, DPT  #[id number]

## 2025-04-04 ENCOUNTER — HOSPITAL ENCOUNTER (EMERGENCY)
Facility: HOSPITAL | Age: 30
Discharge: HOME | End: 2025-04-04
Attending: EMERGENCY MEDICINE
Payer: MEDICAID

## 2025-04-04 ENCOUNTER — APPOINTMENT (OUTPATIENT)
Facility: HOSPITAL | Age: 30
End: 2025-04-04
Payer: MEDICAID

## 2025-04-04 VITALS
HEART RATE: 82 BPM | DIASTOLIC BLOOD PRESSURE: 85 MMHG | RESPIRATION RATE: 20 BRPM | BODY MASS INDEX: 26.03 KG/M2 | SYSTOLIC BLOOD PRESSURE: 130 MMHG | TEMPERATURE: 98.4 F | OXYGEN SATURATION: 100 % | WEIGHT: 162 LBS | HEIGHT: 66 IN

## 2025-04-04 DIAGNOSIS — S16.1XXA NECK MUSCLE STRAIN, INITIAL ENCOUNTER: Primary | ICD-10-CM

## 2025-04-04 PROCEDURE — 99284 EMERGENCY DEPT VISIT MOD MDM: CPT | Performed by: EMERGENCY MEDICINE

## 2025-04-04 PROCEDURE — 72125 CT NECK SPINE W/O DYE: CPT

## 2025-04-04 PROCEDURE — 25010000002 KETOROLAC TROMETHAMINE PER 15 MG: Performed by: PHYSICIAN ASSISTANT

## 2025-04-04 PROCEDURE — 96372 THER/PROPH/DIAG INJ SC/IM: CPT

## 2025-04-04 RX ORDER — OMEPRAZOLE 40 MG/1
40 CAPSULE, DELAYED RELEASE ORAL DAILY
COMMUNITY
Start: 2025-01-10

## 2025-04-04 RX ORDER — CYCLOBENZAPRINE HCL 10 MG
10 TABLET ORAL 3 TIMES DAILY PRN
Qty: 20 TABLET | Refills: 0 | Status: SHIPPED | OUTPATIENT
Start: 2025-04-04

## 2025-04-04 RX ORDER — CYCLOBENZAPRINE HCL 10 MG
10 TABLET ORAL ONCE
Status: COMPLETED | OUTPATIENT
Start: 2025-04-04 | End: 2025-04-04

## 2025-04-04 RX ORDER — KETOROLAC TROMETHAMINE 30 MG/ML
30 INJECTION, SOLUTION INTRAMUSCULAR; INTRAVENOUS ONCE
Status: COMPLETED | OUTPATIENT
Start: 2025-04-04 | End: 2025-04-04

## 2025-04-04 RX ADMIN — CYCLOBENZAPRINE HYDROCHLORIDE 10 MG: 10 TABLET, FILM COATED ORAL at 22:58

## 2025-04-04 RX ADMIN — KETOROLAC TROMETHAMINE 30 MG: 30 INJECTION, SOLUTION INTRAMUSCULAR; INTRAVENOUS at 22:58

## 2025-04-05 NOTE — FSED PROVIDER NOTE
Subjective   History of Present Illness  Patient is a 29-year-old male who presents emergency department complaining of neck pain.  Reports he was playing volleyball tonight and fell.  Reports that he fell his neck jerked forward and then back.  Reports he had minimal pain when this occurred around 1900, but reports the pain increased around 2200.  Patient did not take any medications for his symptoms prior to arrival.  Patient denies numbness, tingling, loss of sensation.  Patient has a significant past medical history of GERD and headaches.      History provided by:  Patient   used: No        Review of Systems   Musculoskeletal:         Neck pain         Past Medical History:   Diagnosis Date    Acid reflux     Headache        No Known Allergies    History reviewed. No pertinent surgical history.    History reviewed. No pertinent family history.    Social History     Socioeconomic History    Marital status: Single   Tobacco Use    Smoking status: Former     Current packs/day: 0.00     Types: Cigarettes     Quit date: 2024     Years since quittin.2    Smokeless tobacco: Never   Vaping Use    Vaping status: Every Day    Substances: Nicotine, Flavoring   Substance and Sexual Activity    Alcohol use: Never    Drug use: Not Currently    Sexual activity: Defer           Objective   Physical Exam  Vitals and nursing note reviewed.   Constitutional:       Appearance: Normal appearance. He is well-developed and normal weight.   HENT:      Head: Normocephalic and atraumatic.      Nose: Nose normal.   Eyes:      Pupils: Pupils are equal, round, and reactive to light.   Cardiovascular:      Rate and Rhythm: Normal rate and regular rhythm.      Pulses: Normal pulses.      Heart sounds: Normal heart sounds.   Pulmonary:      Effort: Pulmonary effort is normal.      Breath sounds: Normal breath sounds.   Abdominal:      General: Abdomen is flat. Bowel sounds are normal. There is no distension.       Palpations: Abdomen is soft.      Tenderness: There is no abdominal tenderness.   Musculoskeletal:      Cervical back: Normal range of motion.        Back:       Comments: The musculature of the cervical spine, no midline tenderness.  Normal  strength.   Skin:     General: Skin is warm and dry.   Neurological:      General: No focal deficit present.      Mental Status: He is alert and oriented to person, place, and time. Mental status is at baseline.   Psychiatric:         Mood and Affect: Mood normal.         Behavior: Behavior normal.         Thought Content: Thought content normal.         Judgment: Judgment normal.         Procedures         CT Cervical Spine Without Contrast  Result Date: 4/4/2025  Impression: No acute fracture or subluxation. Electronically Signed: Buddy Martinez MD  4/4/2025 11:38 PM EDT  Workstation ID: [id number]      ED Course                                           Medical Decision Making  29-year-old male who presents emergency department complaining of neck pain after a fall.  Differential diagnosis includes neck fracture, muscle strain, muscle spasm, nonspecific symptoms.  On physical exam patient has tenderness throughout the musculature of his neck, but no midline tenderness.  Diagnostic evaluation includes a CT scan of the cervical spine.  Acute intervention including Flexeril and Toradol.  Patient's pain is decreased to a 2 out of 10.  CT scan returned nonactionable.  Patient will be discharged home to follow-up appropriately.  He will be discharged home with Flexeril and informed to take Tylenol Motrin for pain control.  Patient is nontoxic-appearing and in no acute distress with full range of motion of his neck at time of discharge.    Amount and/or Complexity of Data Reviewed  Radiology: ordered. Decision-making details documented in ED Course.    Risk  Prescription drug management.        Final diagnoses:   Neck muscle strain, initial encounter       ED Disposition  ED  Disposition       ED Disposition   Discharge    Condition   Stable    Comment   --               [facility] EMERGENCY DEPARTMENT [place]  [address]  [facility] [address] [phone]  Go to   As needed, If symptoms worsen    PATIENT CONNECTION - [facility] [address]  [phone]  Schedule an appointment as soon as possible for a visit in 2 days  As needed, If symptoms worsen         Medication List        New Prescriptions      cyclobenzaprine 10 MG tablet  Commonly known as: FLEXERIL  Take 1 tablet by mouth 3 (Three) Times a Day As Needed for Muscle Spasms.               Where to Get Your Medications        These medications were sent to [facility] - [place], [place] - [address] - [phone]  - [phone]   [address] [place] [id number]      Phone: [phone]   cyclobenzaprine 10 MG tablet